# Patient Record
Sex: FEMALE | Race: WHITE
[De-identification: names, ages, dates, MRNs, and addresses within clinical notes are randomized per-mention and may not be internally consistent; named-entity substitution may affect disease eponyms.]

---

## 2018-06-27 ENCOUNTER — HOSPITAL ENCOUNTER (EMERGENCY)
Dept: HOSPITAL 11 - JP.ED | Age: 68
Discharge: HOME | End: 2018-06-27
Payer: MEDICARE

## 2018-06-27 VITALS — DIASTOLIC BLOOD PRESSURE: 76 MMHG | SYSTOLIC BLOOD PRESSURE: 156 MMHG

## 2018-06-27 DIAGNOSIS — I10: ICD-10-CM

## 2018-06-27 DIAGNOSIS — Z79.899: ICD-10-CM

## 2018-06-27 DIAGNOSIS — M70.61: Primary | ICD-10-CM

## 2018-06-27 PROCEDURE — 20610 DRAIN/INJ JOINT/BURSA W/O US: CPT

## 2018-06-27 PROCEDURE — 99283 EMERGENCY DEPT VISIT LOW MDM: CPT

## 2018-06-27 NOTE — EDM.PDOC
ED HPI GENERAL MEDICAL PROBLEM





- General


Chief Complaint: Lower Extremity Injury/Pain


Stated Complaint: RIGHT HIP PAIN


Time Seen by Provider: 06/27/18 15:00


Source of Information: Reports: Patient, Family


History Limitations: Reports: No Limitations





- History of Present Illness


INITIAL COMMENTS - FREE TEXT/NARRATIVE: 





67-year-old female with right hip pain worsening over the past several days now 

radiating into the right posterior buttock and into the right lateral thigh. No 

injury, sudden movements, recent illnesses, or other source of pain. She is 

having difficulty walking, standing, sleeping or even rolling over. She has not 

had this problem in the past.


Onset: Gradual


Location: Reports: Lower Extremity, Right


Severity: Severe


Associated Symptoms: Reports: No Other Symptoms


  ** Right Hip


Pain Score (Numeric/FACES): 10





- Related Data


 Allergies











Allergy/AdvReac Type Severity Reaction Status Date / Time


 


No Known Allergies Allergy   Verified 06/27/18 15:29











Home Meds: 


 Home Meds





Lisinopril/Hydrochlorothiazide [Lisinopril-Hctz 10-12.5 mg Tab] 1 tab PO DAILY 

07/16/16 [History]











Past Medical History


HEENT History: Reports: Impaired Vision


Cardiovascular History: Reports: Hypertension


OB/GYN History: Reports: Pregnancy


Musculoskeletal History: Reports: Arthritis, Fracture


Other Musculoskeletal History: fx ankle fibula





- Infectious Disease History


Infectious Disease History: Reports: Chicken Pox, Measles, Mumps





- Past Surgical History


GI Surgical History: Reports: Cholecystectomy





Social & Family History





- Tobacco Use


Smoking Status *Q: Never Smoker


Second Hand Smoke Exposure: No





- Caffeine Use


Caffeine Use: Reports: Coffee





- Alcohol Use


Days Per Week of Alcohol Use: 0





- Recreational Drug Use


Recreational Drug Use: No





Review of Systems





- Review of Systems


Review Of Systems: See Below


Constitutional: Denies: Fever


Respiratory: Reports: No Symptoms


Cardiovascular: Reports: No Symptoms


GI/Abdominal: Reports: No Symptoms


Skin: Reports: No Symptoms.  Denies: Rash (No rash over the sore area)


Neurological: Denies: Paresthesia


Psychiatric: Reports: No Symptoms





ED EXAM, GENERAL





- Physical Exam


Exam: See Below


Exam Limited By: No Limitations


General Appearance: Alert, No Apparent Distress, Anxious, Other (Patient is 

uncomfortable but not distressed)


Head: Atraumatic


Respiratory/Chest: No Respiratory Distress


Extremities: Other (Buttock and lateral hip were very painful to even passive 

range of motion. On palpation she was exquisitely sore over the greater 

trochanteric area.)


Neurological: Alert, Oriented





Course





- Vital Signs


Last Recorded V/S: 


 Last Vital Signs











Temp  97.9 F   06/27/18 15:29


 


Pulse  86   06/27/18 15:29


 


Resp  16   06/27/18 15:29


 


BP  156/76 H  06/27/18 15:29


 


Pulse Ox  95   06/27/18 15:29














- Orders/Labs/Meds


Meds: 


Medications














Discontinued Medications














Generic Name Dose Route Start Last Admin





  Trade Name Fernando  PRN Reason Stop Dose Admin


 


Bupivacaine HCl  10 ml  06/27/18 16:23  06/27/18 16:47





  Marcaine 0.5%  INJECT  06/27/18 16:24  10 ml





  ONETIME ONE   Administration





     





     





     





     


 


Triamcinolone Acetonide  40 mg  06/27/18 16:18  06/27/18 16:47





  Kenalog-40  INJECT   40 mg





  ASDIRECTED PRN   Administration





  Pain   





     





     





     














- Re-Assessments/Exams


Free Text/Narrative Re-Assessment/Exam: 





06/27/18 17:30


Under sterile conditions, 40 mg of Kenalog along with 10 mL of 0.5% Marcaine 

were infiltrated into the bursa and on a fan distribution around the greater 

trochanter. This was massaged in with a 4 x 4, and pain was markedly improved. 

She was able to stand with minimal discomfort but still had some pain with 

active extension and flexion of the hip. She was also placed on 50 mg of 

prednisone for the next 3-6 days to be taken with her first meal of the day. 

Recheck early next week if not improving satisfactorily.





Departure





- Departure


Time of Disposition: 17:06


Disposition: Home, Self-Care 01


Condition: Good


Clinical Impression: 


 Trochanteric bursitis of right hip








- Discharge Information


Instructions:  Hip Bursitis


Referrals: 


PCP,None [Primary Care Provider] - 


Forms:  ED Department Discharge


Care Plan Goals: 


Take 5 pills of prednisone with your first meal of the day for the next 3-6 

days. Tylenol and ibuprofen will help as well, increase your activity as 

tolerated. Consider rechecking next week if not improving satisfactorily.

## 2018-08-22 ENCOUNTER — HOSPITAL ENCOUNTER (INPATIENT)
Dept: HOSPITAL 11 - JP.ED | Age: 68
LOS: 3 days | Discharge: HOME | DRG: 603 | End: 2018-08-25
Attending: INTERNAL MEDICINE | Admitting: INTERNAL MEDICINE
Payer: MEDICARE

## 2018-08-22 DIAGNOSIS — Z96.641: ICD-10-CM

## 2018-08-22 DIAGNOSIS — Z98.890: ICD-10-CM

## 2018-08-22 DIAGNOSIS — H54.7: ICD-10-CM

## 2018-08-22 DIAGNOSIS — B96.1: ICD-10-CM

## 2018-08-22 DIAGNOSIS — N39.0: ICD-10-CM

## 2018-08-22 DIAGNOSIS — R53.1: ICD-10-CM

## 2018-08-22 DIAGNOSIS — R82.90: ICD-10-CM

## 2018-08-22 DIAGNOSIS — R11.0: ICD-10-CM

## 2018-08-22 DIAGNOSIS — L03.115: Primary | ICD-10-CM

## 2018-08-22 DIAGNOSIS — I10: ICD-10-CM

## 2018-08-22 DIAGNOSIS — M19.90: ICD-10-CM

## 2018-08-22 DIAGNOSIS — R07.9: ICD-10-CM

## 2018-08-22 DIAGNOSIS — Z79.01: ICD-10-CM

## 2018-08-22 PROCEDURE — 85379 FIBRIN DEGRADATION QUANT: CPT

## 2018-08-22 PROCEDURE — 99285 EMERGENCY DEPT VISIT HI MDM: CPT

## 2018-08-22 PROCEDURE — 36415 COLL VENOUS BLD VENIPUNCTURE: CPT

## 2018-08-22 PROCEDURE — 74018 RADEX ABDOMEN 1 VIEW: CPT

## 2018-08-22 PROCEDURE — 81001 URINALYSIS AUTO W/SCOPE: CPT

## 2018-08-22 PROCEDURE — 82553 CREATINE MB FRACTION: CPT

## 2018-08-22 PROCEDURE — 71045 X-RAY EXAM CHEST 1 VIEW: CPT

## 2018-08-22 PROCEDURE — 93005 ELECTROCARDIOGRAM TRACING: CPT

## 2018-08-22 PROCEDURE — 85025 COMPLETE CBC W/AUTO DIFF WBC: CPT

## 2018-08-22 PROCEDURE — 96374 THER/PROPH/DIAG INJ IV PUSH: CPT

## 2018-08-22 PROCEDURE — 84484 ASSAY OF TROPONIN QUANT: CPT

## 2018-08-22 PROCEDURE — 96375 TX/PRO/DX INJ NEW DRUG ADDON: CPT

## 2018-08-22 PROCEDURE — 86140 C-REACTIVE PROTEIN: CPT

## 2018-08-22 PROCEDURE — 80053 COMPREHEN METABOLIC PANEL: CPT

## 2018-08-22 PROCEDURE — 85610 PROTHROMBIN TIME: CPT

## 2018-08-22 RX ADMIN — SODIUM CHLORIDE SCH MLS/HR: 9 INJECTION, SOLUTION INTRAVENOUS at 09:56

## 2018-08-22 RX ADMIN — ONDANSETRON PRN MG: 2 INJECTION, SOLUTION INTRAMUSCULAR; INTRAVENOUS at 19:41

## 2018-08-22 RX ADMIN — ONDANSETRON PRN MG: 2 INJECTION, SOLUTION INTRAMUSCULAR; INTRAVENOUS at 11:14

## 2018-08-22 RX ADMIN — ONDANSETRON PRN MG: 2 INJECTION, SOLUTION INTRAMUSCULAR; INTRAVENOUS at 15:45

## 2018-08-22 RX ADMIN — SODIUM CHLORIDE SCH MLS/HR: 9 INJECTION, SOLUTION INTRAVENOUS at 21:22

## 2018-08-22 NOTE — CR
CHEST: Portable

 

CLINICAL HISTORY:Abdominal pain

 

COMPARISON:None

 

FINDINGS:  Right size and pulmonary vascularity are normal. Lung fields are clear. No infiltrate effu
richard or pneumothorax seen. There is some scarring in the left upper lobe

 

 

IMPRESSION:  No acute cardiopulmonary process

## 2018-08-22 NOTE — PCM.HP
H&P History of Present Illness





- General


Date of Service: 08/22/18


Admit Problem/Dx: 


 Admission Diagnosis/Problem





Admission Diagnosis/Problem      Cellulitis








Source of Information: Patient, Family, Provider, RN Notes Reviewed


History Limitations: Reports: No Limitations





- History of Present Illness


Initial Comments - Free Text/Narative: 





Ms. Fisher is a 67-year-old woman who is admitted through the emergency 

department with new onset of weakness and nausea, secondary to cellulitis of 

the right hip and thigh. She is status post right total hip arthroplasty done 

on Friday, August 17. She was discharged home the following day and did well 

until early this morning, when she noted development of erythema surrounding 

the surgical wound associated with significant weakness and nausea. She came 

into the emergency department for further evaluation, white blood cell count is 

within normal range, CRP is elevated, and there is appearance of significant 

cellulitis involving the right thigh and lateral hip. She was seen and 

evaluated in the emergency department by Dr. Srikanth Stein for orthopedic 

consult, he did not feel that the wound was infected or that there was evidence 

of a deeper seeded infection. He is recommended hospitalization for IV 

antibiotic therapy. To this point there is no evidence of associated sepsis.


  ** Right Hip


Pain Score (Numeric/FACES): 8





  ** Abdominal


Pain Score (Numeric/FACES): 6





  ** Chest


Pain Score (Numeric/FACES): 4





- Related Data


Allergies/Adverse Reactions: 


 Allergies











Allergy/AdvReac Type Severity Reaction Status Date / Time


 


No Known Allergies Allergy   Verified 08/22/18 06:37











Home Medications: 


 Home Meds





Lisinopril/Hydrochlorothiazide [Lisinopril-Hctz 10-12.5 mg Tab] 1 tab PO DAILY 

07/16/16 [History]


Acetaminophen [Tylenol Extra Strength] 500 mg PO Q4HR PRN 08/22/18 [History]


Apixaban [Eliquis] 2.5 mg PO BID 08/22/18 [History]


traMADol HCl [Tramadol HCl] 50 mg PO Q6H PRN 08/22/18 [History]











Past Medical History


HEENT History: Reports: Impaired Vision


Cardiovascular History: Reports: Hypertension


OB/GYN History: Reports: Pregnancy


Musculoskeletal History: Reports: Arthritis, Fracture


Other Musculoskeletal History: fx ankle fibula


Hematologic History: Reports: Anticoagulation Therapy





- Infectious Disease History


Infectious Disease History: Reports: Chicken Pox, Measles, Mumps





- Past Surgical History


GI Surgical History: Reports: Cholecystectomy


Female  Surgical History: Reports: Tubal Ligation


Musculoskeletal Surgical History: Reports: Hip Replacement





Social & Family History





- Tobacco Use


Smoking Status *Q: Never Smoker





- Caffeine Use


Caffeine Use: Reports: Coffee





- Recreational Drug Use


Recreational Drug Use: No





H&P Review of Systems





- Review of Systems:


Review Of Systems: See Below


General: Reports: Chills, Weakness, Diaphoresis, Decreased Appetite


HEENT: Reports: No Symptoms


Pulmonary: Reports: No Symptoms


Cardiovascular: Reports: No Symptoms


Gastrointestinal: Reports: Anorexia, Nausea.  Denies: Abdominal Pain, Black 

Stool, Bloody Stool, Diarrhea, Difficulty Swallowing, Distension, Vomiting


Genitourinary: Reports: No Symptoms


Musculoskeletal: Reports: No Symptoms


Skin: Reports: Erythema (With tenderness right lateral hip and thigh)


Psychiatric: Reports: No Symptoms


Neurological: Reports: No Symptoms


Hematologic/Lymphatic: Reports: No Symptoms


Immunologic: Reports: No Symptoms





Exam





- Exam


Exam: See Below





- Vital Signs


Vital Signs: 


 Last Vital Signs











Temp  97.6 F   08/22/18 06:40


 


Pulse  63   08/22/18 06:40


 


Resp  18   08/22/18 06:40


 


BP  156/57 H  08/22/18 06:40


 


Pulse Ox  99   08/22/18 06:40











Weight: 190 lb





- Exam


Quality Assessment: DVT Prophylaxis


General: Alert, Oriented, Cooperative, Moderate Distress


HEENT: Conjunctiva Clear, Hearing Intact, Mucosa Moist & Pink, Normal Nasal 

Septum, Posterior Pharynx Clear, Pupils Equal


Neck: Supple, Trachea Midline, +2 Carotid Pulse wo Bruit


Lungs: Clear to Auscultation, Normal Respiratory Effort


Cardiovascular: Regular Rate, Regular Rhythm, Normal S1, Normal S2.  No: 

Systolic Murmur, Diastolic Murmur


GI/Abdominal Exam: Soft, Non-Tender, No Organomegaly, No Distention


Back Exam: Normal Inspection, Full Range of Motion


Extremities: Non-Tender, No Pedal Edema


Skin: Other (Erythema with tenderness to palpation right lateral hip and thigh, 

surgical wound is intact with no evidence of drainage)


Neurological: Cranial Nerves Intact, Strength Equal Bilateral, Normal Speech, 

Normal Tone, Sensation Intact.  No: Focal Deficit


Neuro Extensive - Mental Status: Alert, Oriented x3, Normal Mood/Affect, Normal 

Cognition, Memory Intact





- Patient Data


Lab Results Last 24 hrs: 


 Laboratory Results - last 24 hr











  08/22/18 08/22/18 08/22/18 Range/Units





  07:07 07:07 07:07 


 


WBC  6.6    (4.5-11.0)  K/uL


 


RBC  3.63    (3.30-5.50)  M/uL


 


Hgb  11.9 L    (12.0-15.0)  g/dL


 


Hct  34.2 L    (36.0-48.0)  %


 


MCV  94    (80-98)  fL


 


MCH  33 H    (27-31)  pg


 


MCHC  35    (32-36)  %


 


Plt Count  272    (150-400)  K/uL


 


Neut % (Auto)  76 H    (36-66)  %


 


Lymph % (Auto)  14 L    (24-44)  %


 


Mono % (Auto)  10 H    (2-6)  %


 


Eos % (Auto)  1 L    (2-4)  %


 


Baso % (Auto)  1    (0-1)  %


 


PT   10.4   (9.5-12.0)  sec


 


INR   0.94   (0.80-1.20)  


 


D-Dimer, Quantitative     (0.0-400.0)  ng/mL


 


Sodium    133 L  (140-148)  mmol/L


 


Potassium    3.2 L  (3.6-5.2)  mmol/L


 


Chloride    95 L  (100-108)  mmol/L


 


Carbon Dioxide    30  (21-32)  mmol/L


 


Anion Gap    11.2  (5.0-14.0)  mmol/L


 


BUN    8  (7-18)  mg/dL


 


Creatinine    0.8  (0.6-1.0)  mg/dL


 


Est Cr Clr Drug Dosing    67.27  mL/min


 


Estimated GFR (MDRD)    > 60  (>60)  


 


Glucose    126 H  ()  mg/dL


 


Lactic Acid     (0.4-2.0)  mmol/L


 


Calcium    8.8  (8.5-10.1)  mg/dL


 


Total Bilirubin    0.8  (0.2-1.0)  mg/dL


 


AST    38 H  (15-37)  U/L


 


ALT    29  (12-78)  U/L


 


Alkaline Phosphatase    50  ()  U/L


 


CK-MB (CK-2)    2.1  (0-3.6)  mg/mL


 


Troponin I    < 0.017  (0.000-0.056)  ng/mL


 


C-Reactive Protein     (0.0-0.3)  mg/dL


 


Total Protein    6.2 L  (6.4-8.2)  g/dL


 


Albumin    2.9 L  (3.4-5.0)  g/dL


 


Globulin    3.3  (2.3-3.5)  g/dL


 


Albumin/Globulin Ratio    0.9 L  (1.2-2.2)  














  08/22/18 08/22/18 08/22/18 Range/Units





  07:12 07:18 09:38 


 


WBC     (4.5-11.0)  K/uL


 


RBC     (3.30-5.50)  M/uL


 


Hgb     (12.0-15.0)  g/dL


 


Hct     (36.0-48.0)  %


 


MCV     (80-98)  fL


 


MCH     (27-31)  pg


 


MCHC     (32-36)  %


 


Plt Count     (150-400)  K/uL


 


Neut % (Auto)     (36-66)  %


 


Lymph % (Auto)     (24-44)  %


 


Mono % (Auto)     (2-6)  %


 


Eos % (Auto)     (2-4)  %


 


Baso % (Auto)     (0-1)  %


 


PT     (9.5-12.0)  sec


 


INR     (0.80-1.20)  


 


D-Dimer, Quantitative  1820 H    (0.0-400.0)  ng/mL


 


Sodium     (140-148)  mmol/L


 


Potassium     (3.6-5.2)  mmol/L


 


Chloride     (100-108)  mmol/L


 


Carbon Dioxide     (21-32)  mmol/L


 


Anion Gap     (5.0-14.0)  mmol/L


 


BUN     (7-18)  mg/dL


 


Creatinine     (0.6-1.0)  mg/dL


 


Est Cr Clr Drug Dosing     mL/min


 


Estimated GFR (MDRD)     (>60)  


 


Glucose     ()  mg/dL


 


Lactic Acid    1.9  (0.4-2.0)  mmol/L


 


Calcium     (8.5-10.1)  mg/dL


 


Total Bilirubin     (0.2-1.0)  mg/dL


 


AST     (15-37)  U/L


 


ALT     (12-78)  U/L


 


Alkaline Phosphatase     ()  U/L


 


CK-MB (CK-2)     (0-3.6)  mg/mL


 


Troponin I     (0.000-0.056)  ng/mL


 


C-Reactive Protein   7.85 H   (0.0-0.3)  mg/dL


 


Total Protein     (6.4-8.2)  g/dL


 


Albumin     (3.4-5.0)  g/dL


 


Globulin     (2.3-3.5)  g/dL


 


Albumin/Globulin Ratio     (1.2-2.2)  











Result Diagrams: 


 08/22/18 07:07





 08/22/18 07:07





*Q Meaningful Use (ADM)





- VTE Risk Assess *Q


Each Risk Factor Represents 1 Point: Obesity ( BMI > 25 kg/m2)


Total Score 1 Point Risk Factors: 1


Each Risk Factor Represents 2 Points: Age 60 - 74 Years


Total Score 2 Point Risk Factors: 2


Each Risk Factor Represents 3 Points: None


Total Score 3 Point Risk Factors: 0


Each Risk Factor Represents 5 Points: Hip, Pelvis or Leg Fracture, Less than 1 

month


Total Score 5 Point Risk Factors: 5


Venous Thromboembolism Risk Factor Score *Q: 8


Problem List Initiated/Reviewed/Updated: Yes


Orders Last 24hrs: 


 Active Orders 24 hr











 Category Date Time Status


 


 Patient Status Manage Transfer [TRANSFER] Routine ADT  08/22/18 09:38 Active


 


 Cardiac Monitoring [RC] .As Directed Care  08/22/18 06:54 Active


 


 EKG Documentation Completion [RC] ASDIRECTED Care  08/22/18 06:55 Active


 


 Peripheral IV Care [RC] .AS DIRECTED Care  08/22/18 06:55 Active


 


 CULTURE BLOOD [BC] Stat Lab  08/22/18 09:48 Received


 


 CULTURE BLOOD [BC] Stat Lab  08/22/18 09:55 Received


 


 UA W/MICROSCOPIC [URIN] Stat Lab  08/22/18 09:08 Ordered


 


 Lactated Ringers [Ringers, Lactated] 1,000 ml Med  08/22/18 09:45 Active





 IV ASDIRECTED   


 


 Meropenem [Merrem] 1 gm Med  08/22/18 09:45 Ordered





 Sodium Chloride 0.9% [Normal Saline] 100 ml   





 IV Q8H   


 


 Morphine Med  08/22/18 06:54 Active





 4 mg IVPUSH Q10M PRN   


 


 Nitroglycerin [Nitrostat] Med  08/22/18 06:54 Active





 0.4 mg SL Q5M PRN   


 


 Sodium Chloride 0.9% [Saline Flush] Med  08/22/18 06:54 Active





 10 ml FLUSH ASDIRECTED PRN   


 


 Vancomycin 1.3 gm Med  08/22/18 10:00 Active





 Sodium Chloride 0.9% [Normal Saline] 250 ml   





 IV Q12H   


 


 Blood Culture x2 Reflex Set [OM.PC] Urgent Oth  08/22/18 09:37 Ordered


 


 Peripheral IV Insertion Adult [OM.PC] Stat Oth  08/22/18 06:54 Ordered


 


 Saline Lock Insert [OM.PC] Stat Oth  08/22/18 06:54 Ordered


 


 Resuscitation Status Routine Resus Stat  08/22/18 09:40 Ordered


 


 EKG 12 Lead [EK] Stat Ther  08/22/18 06:55 Ordered








 Medication Orders





Lactated Ringer's (Ringers, Lactated)  1,000 mls @ 500 mls/hr IV ASDIRECTED DIANA


   Stop: 08/22/18 13:46


   Last Admin: 08/22/18 09:52  Dose: 500 mls/hr


Meropenem 1 gm/ Sodium (Chloride)  100 mls @ 200 mls/hr IV Q8H DIANA


Vancomycin HCl 1.3 gm/ Sodium (Chloride)  250 mls @ 250 mls/hr IV Q12H DIANA


   Last Admin: 08/22/18 09:56  Dose: 250 mls/hr


Morphine Sulfate (Morphine)  4 mg IVPUSH Q10M PRN


   PRN Reason: Chest Pain


   Stop: 08/23/18 06:55


   Last Admin: 08/22/18 07:02  Dose: 4 mg


Nitroglycerin (Nitrostat)  0.4 mg SL Q5M PRN


   PRN Reason: Chest Pain


   Stop: 08/23/18 06:55


Sodium Chloride (Saline Flush)  10 ml FLUSH ASDIRECTED PRN


   PRN Reason: Keep Vein Open


   Last Admin: 08/22/18 07:13  Dose: 10 ml








Assessment/Plan Comment:: 





ASSESSMENT AND PLAN





CELLULITIS RIGHT LATERAL HIP AND THIGH-status post right total hip arthroplasty 

done 5 days ago. Wound is intact with no evidence of significant drainage and 

no current evidence of sepsis.


-Blood cultures pending


-IV fluids


-Soft tissue ultrasound look for any evidence of abscess


-IV vancomycin and meropenem pending culture results





STATUS POST RIGHT TOTAL HIP ARTHROPLASTY


-Physical therapy consult





HYPERTENSION


-Continue outpatient medications





MAINTENANCE ISSUES


-DVT prophylaxis; current therapy with Eliquis should provide adequate DVT 

prophylaxis


-GI prophylaxis; not indicated


-Rowe catheter; not indicated


-Nutrition; regular diet


-Nicotine dependence; not required





CODE STATUS-FULL CODE





ADMISSION STATUS-patient will be admitted to inpatient status, expect at least 

a 2 night hospital stay for evaluation and management of problems as outlined 

above.  At the time of this admission I do not reasonably expected evaluation 

and management of this problem will require more than a 96 hour hospital stay.





DISPOSITION-anticipate discharge to home after the hospital stay.





PRIMARY CARE PROVIDER-Perri Castillo

## 2018-08-22 NOTE — CR
Abdomen 1V Flat

 

CLINICAL HISTORY: Abdominal pain

 

FINDINGS: The bowel gas pattern is nonobstructive. No abnormal masses are noted. There are surgical c
lips in the right upper quadrant.  

 

IMPRESSION: Nonacute intestinal gas pattern

## 2018-08-22 NOTE — US
Extremity Non Vascular Rt

 

CLINICAL HISTORY: Cellulitis right lateral hip and thigh, recent hip surgery

 

FINDINGS: There is some soft tissue edema around the incision site. No underlying fluid collections a
re identified

 

IMPRESSION: Redness and swelling near the incision site may represent cellulitis

 

No evidence of abscess

## 2018-08-22 NOTE — EDM.PDOC
<OfficerThomas - Last Filed: 08/22/18 06:56>





ED HPI GENERAL MEDICAL PROBLEM





- General


Chief Complaint: General


Stated Complaint: [place] HAD HIP REPLACEMENT LAST WEEK


Time Seen by Provider: 08/22/18 06:49


Source of Information: Reports: Patient, Family, RN Notes Reviewed


History Limitations: Reports: No Limitations





- History of Present Illness


INITIAL COMMENTS - FREE TEXT/NARRATIVE: 





67-year-old female presents to the emergency department today complaint of 

chest pain she recently underwent right hip replacement at [facility] was 

discharged last week has been doing well only taking tramadol and Tylenol for 

pain control. Awoke this morning with nausea and hip pain was diaphoretic did 

feel short of breath EMS services were called was given Zofran IM in route upon 

arrival at the emergency department developed chest pain with numbness and 

tingling down the left arm was given 4 aspirin on the ramp. At this time she 

continues to complain of chest pain, shortness of breath, nausea


  ** Right Hip


Pain Score (Numeric/FACES): 8





  ** Abdominal


Pain Score (Numeric/FACES): 6





  ** Chest


Pain Score (Numeric/FACES): 4





- Related Data


 Allergies











Allergy/AdvReac Type Severity Reaction Status Date / Time


 


No Known Allergies Allergy   Verified 08/22/18 06:37











Home Meds: 


 Home Meds





Lisinopril/Hydrochlorothiazide [Lisinopril-Hctz 10-12.5 mg Tab] 1 tab PO DAILY 

07/16/16 [History]


Acetaminophen [Tylenol Extra Strength] 500 mg PO Q4HR PRN 08/22/18 [History]


Apixaban [Eliquis] 2.5 mg PO BID 08/22/18 [History]


traMADol HCl [Tramadol HCl] 50 mg PO Q6H PRN 08/22/18 [History]











Past Medical History


HEENT History: Reports: Impaired Vision


Cardiovascular History: Reports: Hypertension


OB/GYN History: Reports: Pregnancy


Musculoskeletal History: Reports: Arthritis, Fracture


Other Musculoskeletal History: fx ankle fibula


Hematologic History: Reports: Anticoagulation Therapy





- Infectious Disease History


Infectious Disease History: Reports: Chicken Pox, Measles, Mumps





- Past Surgical History


GI Surgical History: Reports: Cholecystectomy


Female  Surgical History: Reports: Tubal Ligation


Musculoskeletal Surgical History: Reports: Hip Replacement





Social & Family History





- Tobacco Use


Smoking Status *Q: Never Smoker





- Caffeine Use


Caffeine Use: Reports: Coffee





- Recreational Drug Use


Recreational Drug Use: No





ED ROS GENERAL





- Review of Systems


Review Of Systems: See Below


Constitutional: Reports: No Symptoms


HEENT: Reports: No Symptoms


Respiratory: Reports: Shortness of Breath


Cardiovascular: Reports: Chest Pain


GI/Abdominal: Reports: Nausea.  Denies: Vomiting


: Reports: No Symptoms


Musculoskeletal: Reports: Arm Pain (Left arm)


Skin: Reports: No Symptoms


Neurological: Reports: Numbness (Left arm)





ED EXAM, GENERAL





- Physical Exam


Exam: See Below


Exam Limited By: No Limitations


General Appearance: Alert, Mild Distress


Head: Atraumatic, Normocephalic


Neck: Normal Inspection, Supple, Non-Tender, Full Range of Motion


Respiratory/Chest: No Respiratory Distress, Lungs Clear, Normal Breath Sounds, 

No Accessory Muscle Use, Chest Non-Tender


Cardiovascular: Regular Rate, Rhythm, No Murmur


GI/Abdominal: Soft, Non-Tender


Extremities: Non-Tender, No Pedal Edema


Neurological: Alert, Oriented





Course





- Vital Signs


Last Recorded V/S: 


 Last Vital Signs











Temp  36.4 C   08/22/18 06:40


 


Pulse  63   08/22/18 06:40


 


Resp  18   08/22/18 06:40


 


BP  156/57 H  08/22/18 06:40


 


Pulse Ox  99   08/22/18 06:40














- Orders/Labs/Meds


Orders: 


 Active Orders 24 hr











 Category Date Time Status


 


 Cardiac Monitoring [RC] .As Directed Care  08/22/18 06:54 Active


 


 EKG Documentation Completion [RC] ASDIRECTED Care  08/22/18 06:55 Active


 


 Peripheral IV Care [RC] .AS DIRECTED Care  08/22/18 06:55 Active


 


 Morphine Med  08/22/18 06:54 Active





 4 mg IVPUSH Q10M PRN   


 


 Nitroglycerin [Nitrostat] Med  08/22/18 06:54 Active





 0.4 mg SL Q5M PRN   


 


 Sodium Chloride 0.9% [Saline Flush] Med  08/22/18 06:54 Active





 10 ml FLUSH ASDIRECTED PRN   


 


 Peripheral IV Insertion Adult [OM.PC] Stat Oth  08/22/18 06:54 Ordered


 


 Saline Lock Insert [OM.PC] Stat Oth  08/22/18 06:54 Ordered


 


 EKG 12 Lead [EK] Stat Ther  08/22/18 06:55 Ordered








 Medication Orders





Morphine Sulfate (Morphine)  4 mg IVPUSH Q10M PRN


   PRN Reason: Chest Pain


   Stop: 08/23/18 06:55


   Last Admin: 08/22/18 07:02  Dose: 4 mg


Nitroglycerin (Nitrostat)  0.4 mg SL Q5M PRN


   PRN Reason: Chest Pain


   Stop: 08/23/18 06:55


Sodium Chloride (Saline Flush)  10 ml FLUSH ASDIRECTED PRN


   PRN Reason: Keep Vein Open


   Last Admin: 08/22/18 07:13  Dose: 10 ml








Labs: 


 Laboratory Tests











  08/22/18 08/22/18 08/22/18 Range/Units





  07:07 07:07 07:07 


 


WBC  6.6    (4.5-11.0)  K/uL


 


RBC  3.63    (3.30-5.50)  M/uL


 


Hgb  11.9 L    (12.0-15.0)  g/dL


 


Hct  34.2 L    (36.0-48.0)  %


 


MCV  94    (80-98)  fL


 


MCH  33 H    (27-31)  pg


 


MCHC  35    (32-36)  %


 


Plt Count  272    (150-400)  K/uL


 


Neut % (Auto)  76 H    (36-66)  %


 


Lymph % (Auto)  14 L    (24-44)  %


 


Mono % (Auto)  10 H    (2-6)  %


 


Eos % (Auto)  1 L    (2-4)  %


 


Baso % (Auto)  1    (0-1)  %


 


PT   10.4   (9.5-12.0)  sec


 


INR   0.94   (0.80-1.20)  


 


D-Dimer, Quantitative     (0.0-400.0)  ng/mL


 


Sodium    133 L  (140-148)  mmol/L


 


Potassium    3.2 L  (3.6-5.2)  mmol/L


 


Chloride    95 L  (100-108)  mmol/L


 


Carbon Dioxide    30  (21-32)  mmol/L


 


Anion Gap    11.2  (5.0-14.0)  mmol/L


 


BUN    8  (7-18)  mg/dL


 


Creatinine    0.8  (0.6-1.0)  mg/dL


 


Est Cr Clr Drug Dosing    67.27  mL/min


 


Estimated GFR (MDRD)    > 60  (>60)  


 


Glucose    126 H  ()  mg/dL


 


Calcium    8.8  (8.5-10.1)  mg/dL


 


Total Bilirubin    0.8  (0.2-1.0)  mg/dL


 


AST    38 H  (15-37)  U/L


 


ALT    29  (12-78)  U/L


 


Alkaline Phosphatase    50  ()  U/L


 


CK-MB (CK-2)    2.1  (0-3.6)  mg/mL


 


Troponin I    < 0.017  (0.000-0.056)  ng/mL


 


C-Reactive Protein     (0.0-0.3)  mg/dL


 


Total Protein    6.2 L  (6.4-8.2)  g/dL


 


Albumin    2.9 L  (3.4-5.0)  g/dL


 


Globulin    3.3  (2.3-3.5)  g/dL


 


Albumin/Globulin Ratio    0.9 L  (1.2-2.2)  














  08/22/18 08/22/18 Range/Units





  07:12 07:18 


 


WBC    (4.5-11.0)  K/uL


 


RBC    (3.30-5.50)  M/uL


 


Hgb    (12.0-15.0)  g/dL


 


Hct    (36.0-48.0)  %


 


MCV    (80-98)  fL


 


MCH    (27-31)  pg


 


MCHC    (32-36)  %


 


Plt Count    (150-400)  K/uL


 


Neut % (Auto)    (36-66)  %


 


Lymph % (Auto)    (24-44)  %


 


Mono % (Auto)    (2-6)  %


 


Eos % (Auto)    (2-4)  %


 


Baso % (Auto)    (0-1)  %


 


PT    (9.5-12.0)  sec


 


INR    (0.80-1.20)  


 


D-Dimer, Quantitative  1820 H   (0.0-400.0)  ng/mL


 


Sodium    (140-148)  mmol/L


 


Potassium    (3.6-5.2)  mmol/L


 


Chloride    (100-108)  mmol/L


 


Carbon Dioxide    (21-32)  mmol/L


 


Anion Gap    (5.0-14.0)  mmol/L


 


BUN    (7-18)  mg/dL


 


Creatinine    (0.6-1.0)  mg/dL


 


Est Cr Clr Drug Dosing    mL/min


 


Estimated GFR (MDRD)    (>60)  


 


Glucose    ()  mg/dL


 


Calcium    (8.5-10.1)  mg/dL


 


Total Bilirubin    (0.2-1.0)  mg/dL


 


AST    (15-37)  U/L


 


ALT    (12-78)  U/L


 


Alkaline Phosphatase    ()  U/L


 


CK-MB (CK-2)    (0-3.6)  mg/mL


 


Troponin I    (0.000-0.056)  ng/mL


 


C-Reactive Protein   7.85 H  (0.0-0.3)  mg/dL


 


Total Protein    (6.4-8.2)  g/dL


 


Albumin    (3.4-5.0)  g/dL


 


Globulin    (2.3-3.5)  g/dL


 


Albumin/Globulin Ratio    (1.2-2.2)  











Meds: 


Medications











Generic Name Dose Route Start Last Admin





  Trade Name Freq  PRN Reason Stop Dose Admin


 


Morphine Sulfate  4 mg  08/22/18 06:54  08/22/18 07:02





  Morphine  IVPUSH  08/23/18 06:55  4 mg





  Q10M PRN   Administration





  Chest Pain   





     





     





     


 


Nitroglycerin  0.4 mg  08/22/18 06:54  





  Nitrostat  SL  08/23/18 06:55  





  Q5M PRN   





  Chest Pain   





     





     





     


 


Sodium Chloride  10 ml  08/22/18 06:54  08/22/18 07:13





  Saline Flush  FLUSH   10 ml





  ASDIRECTED PRN   Administration





  Keep Vein Open   





     





     





     














Discontinued Medications














Generic Name Dose Route Start Last Admin





  Trade Name Freq  PRN Reason Stop Dose Admin


 


Ondansetron HCl  4 mg  08/22/18 07:11  08/22/18 07:12





  Zofran  IVPUSH  08/22/18 07:12  4 mg





  ONETIME ONE   Administration





     





     





     





     


 


Ondansetron HCl  Confirm  08/22/18 07:11  08/22/18 07:19





  Zofran  Administered  08/22/18 07:12  Not Given





  Dose   





  4 mg   





  .ROUTE   





  .STK-MED ONE   





     





     





     





     


 


Potassium Chloride  40 meq  08/22/18 07:58  08/22/18 08:14





  Potassium Chloride  PO  08/22/18 07:59  40 meq





  ONETIME ONE   Administration





     





     





     





     














Departure





- Departure


Disposition: Admitted As Inpatient 67


Clinical Impression: 


 Cellulitis of hip, right, Hypokalemia








- Discharge Information


Referrals: 


Georgina Castillo PA-C [Primary Care Provider] - 


Forms:  ED Department Discharge





<Rm Perez - Last Filed: 08/22/18 08:52>





ED HPI GENERAL MEDICAL PROBLEM





- History of Present Illness


INITIAL COMMENTS - FREE TEXT/NARRATIVE: 





Is on Eliquis since her surgery. Missed last night's dose.


Onset: Today


Onset Date: 08/22/18


Onset Time: 06:00


Duration: Minutes:


Location: Reports: Chest, Lower Extremity, Right


Quality: Reports: Pressure


Severity: Moderate


Improves with: Reports: None


Worsens with: Reports: Other (unknown)


Context: Reports: Other (Recent hip replacement)


Associated Symptoms: Reports: Chest Pain (L sided), Nausea/Vomiting (no vomiting

), Shortness of Breath.  Denies: Fever/Chills


Treatments PTA: Reports: Other (see below) (EMS aspirin, Zofran IM)





ED ROS GENERAL





- Review of Systems


Psychiatric: Reports: Anxiety (today)





ED EXAM, GENERAL





- Physical Exam


Eye Exam: Bilateral Eye: Normal Inspection


Ears: Normal External Exam, Normal Canal, Hearing Grossly Normal


Ear Exam: Bilateral Ear: Auricle Normal, Canal Normal


Nose: Normal Inspection, Normal Mucosa, No Blood


Throat/Mouth: Normal Inspection, Normal Lips, Normal Voice, No Airway Compromise


Back Exam: Normal Inspection.  No: CVA Tenderness (R), CVA Tenderness (L)


Extremities: Increased Warmth, Redness (R hip area, surgical wound present from 

recent hip replacement.)


Neurological: CN II-XII Intact, Normal Cognition, No Motor/Sensory Deficits


Psychiatric: Normal Affect, Normal Mood


Skin Exam: Warm, Dry, Intact, No Rash, Erythema (R hip area with increased 

warmth.), Increased Warmth (R hip area), Wound/Incision (surgical wound)


Lymphatic: No Adenopathy





EKG INTERPRETATION


EKG Date: 08/22/18


Time: 06:25


Rhythm: NSR


Rate (Beats/Min): 62


Axis: Normal


P-Wave: Present


QRS: Normal


ST-T: Normal


QT: Normal


Comparison: NA - No Prior EKG





Course





- Vital Signs


Text/Narrative:: 





Dr. Gold notified @ [facility]





- Orders/Labs/Meds


Labs: 


 Laboratory Tests











  08/22/18 08/22/18 08/22/18 Range/Units





  07:07 07:07 07:07 


 


WBC  6.6    (4.5-11.0)  K/uL


 


RBC  3.63    (3.30-5.50)  M/uL


 


Hgb  11.9 L    (12.0-15.0)  g/dL


 


Hct  34.2 L    (36.0-48.0)  %


 


MCV  94    (80-98)  fL


 


MCH  33 H    (27-31)  pg


 


MCHC  35    (32-36)  %


 


Plt Count  272    (150-400)  K/uL


 


Neut % (Auto)  76 H    (36-66)  %


 


Lymph % (Auto)  14 L    (24-44)  %


 


Mono % (Auto)  10 H    (2-6)  %


 


Eos % (Auto)  1 L    (2-4)  %


 


Baso % (Auto)  1    (0-1)  %


 


PT   10.4   (9.5-12.0)  sec


 


INR   0.94   (0.80-1.20)  


 


D-Dimer, Quantitative     (0.0-400.0)  ng/mL


 


Sodium    133 L  (140-148)  mmol/L


 


Potassium    3.2 L  (3.6-5.2)  mmol/L


 


Chloride    95 L  (100-108)  mmol/L


 


Carbon Dioxide    30  (21-32)  mmol/L


 


Anion Gap    11.2  (5.0-14.0)  mmol/L


 


BUN    8  (7-18)  mg/dL


 


Creatinine    0.8  (0.6-1.0)  mg/dL


 


Est Cr Clr Drug Dosing    67.27  mL/min


 


Estimated GFR (MDRD)    > 60  (>60)  


 


Glucose    126 H  ()  mg/dL


 


Calcium    8.8  (8.5-10.1)  mg/dL


 


Total Bilirubin    0.8  (0.2-1.0)  mg/dL


 


AST    38 H  (15-37)  U/L


 


ALT    29  (12-78)  U/L


 


Alkaline Phosphatase    50  ()  U/L


 


CK-MB (CK-2)    2.1  (0-3.6)  mg/mL


 


Troponin I    < 0.017  (0.000-0.056)  ng/mL


 


C-Reactive Protein     (0.0-0.3)  mg/dL


 


Total Protein    6.2 L  (6.4-8.2)  g/dL


 


Albumin    2.9 L  (3.4-5.0)  g/dL


 


Globulin    3.3  (2.3-3.5)  g/dL


 


Albumin/Globulin Ratio    0.9 L  (1.2-2.2)  














  08/22/18 08/22/18 Range/Units





  07:12 07:18 


 


WBC    (4.5-11.0)  K/uL


 


RBC    (3.30-5.50)  M/uL


 


Hgb    (12.0-15.0)  g/dL


 


Hct    (36.0-48.0)  %


 


MCV    (80-98)  fL


 


MCH    (27-31)  pg


 


MCHC    (32-36)  %


 


Plt Count    (150-400)  K/uL


 


Neut % (Auto)    (36-66)  %


 


Lymph % (Auto)    (24-44)  %


 


Mono % (Auto)    (2-6)  %


 


Eos % (Auto)    (2-4)  %


 


Baso % (Auto)    (0-1)  %


 


PT    (9.5-12.0)  sec


 


INR    (0.80-1.20)  


 


D-Dimer, Quantitative  1820 H   (0.0-400.0)  ng/mL


 


Sodium    (140-148)  mmol/L


 


Potassium    (3.6-5.2)  mmol/L


 


Chloride    (100-108)  mmol/L


 


Carbon Dioxide    (21-32)  mmol/L


 


Anion Gap    (5.0-14.0)  mmol/L


 


BUN    (7-18)  mg/dL


 


Creatinine    (0.6-1.0)  mg/dL


 


Est Cr Clr Drug Dosing    mL/min


 


Estimated GFR (MDRD)    (>60)  


 


Glucose    ()  mg/dL


 


Calcium    (8.5-10.1)  mg/dL


 


Total Bilirubin    (0.2-1.0)  mg/dL


 


AST    (15-37)  U/L


 


ALT    (12-78)  U/L


 


Alkaline Phosphatase    ()  U/L


 


CK-MB (CK-2)    (0-3.6)  mg/mL


 


Troponin I    (0.000-0.056)  ng/mL


 


C-Reactive Protein   7.85 H  (0.0-0.3)  mg/dL


 


Total Protein    (6.4-8.2)  g/dL


 


Albumin    (3.4-5.0)  g/dL


 


Globulin    (2.3-3.5)  g/dL


 


Albumin/Globulin Ratio    (1.2-2.2)  














- Radiology Interpretation


Free Text/Narrative:: 





CXR-negative





Abd flat-stool ascending colon, small stool descending colon.





Departure





- Departure


Time of Disposition: 09:00


Condition: Fair





- Discharge Information


*PRESCRIPTION DRUG MONITORING PROGRAM REVIEWED*: Not Applicable


*COPY OF PRESCRIPTION DRUG MONITORING REPORT IN PATIENT [facility]: Not Applicable

## 2018-08-23 RX ADMIN — ONDANSETRON PRN MG: 2 INJECTION, SOLUTION INTRAMUSCULAR; INTRAVENOUS at 03:37

## 2018-08-23 RX ADMIN — SODIUM CHLORIDE SCH MLS/HR: 9 INJECTION, SOLUTION INTRAVENOUS at 13:33

## 2018-08-23 RX ADMIN — SODIUM CHLORIDE SCH MLS/HR: 9 INJECTION, SOLUTION INTRAVENOUS at 21:45

## 2018-08-23 RX ADMIN — ONDANSETRON PRN MG: 2 INJECTION, SOLUTION INTRAMUSCULAR; INTRAVENOUS at 09:26

## 2018-08-23 NOTE — PCM.PN
- General Info


Date of Service: 08/23/18


Subjective Update: 





Ms. Fisher has been stable since admission yesterday with no significant 

temperature elevations and stable vital signs. Energy level and appetite have 

improved significantly and she is no longer experiencing significant nausea. 

Area of cellulitis has improved significantly from admission.





- Review of Systems


General: Denies: Fever, Weakness, Chills


Pulmonary: Reports: No Symptoms


Cardiovascular: Reports: No Symptoms


Gastrointestinal: Reports: No Symptoms


Skin: Reports: Other (Cellulitis significantly improved)





- Patient Data


Vitals - Most Recent: 


 Last Vital Signs











Temp  98.6 F   08/23/18 12:39


 


Pulse  65   08/23/18 12:39


 


Resp  16   08/23/18 12:39


 


BP  129/59 L  08/23/18 12:39


 


Pulse Ox  97   08/23/18 12:39











Weight - Most Recent: 196 lb 3.382 oz


I&O - Last 24 Hours: 


 Intake & Output











 08/22/18 08/23/18 08/23/18





 22:59 06:59 14:59


 


Intake Total 840 2996 1250


 


Output Total 700 700 325


 


Balance 140 2296 925











Lab Results Last 24 Hours: 


 Laboratory Results - last 24 hr











  08/23/18 08/23/18 Range/Units





  05:00 05:00 


 


WBC  5.2   (4.5-11.0)  K/uL


 


RBC  3.44   (3.30-5.50)  M/uL


 


Hgb  11.4 L   (12.0-15.0)  g/dL


 


Hct  32.9 L   (36.0-48.0)  %


 


MCV  96   (80-98)  fL


 


MCH  33 H   (27-31)  pg


 


MCHC  35   (32-36)  %


 


Plt Count  267   (150-400)  K/uL


 


Add Manual Diff  Yes   


 


Neutrophils % (Manual)  62   (36-66)  %


 


Band Neutrophils %  1 L   (5-11)  %


 


Lymphocytes % (Manual)  20 L   (24-44)  %


 


Monocytes % (Manual)  11 H   (2-6)  %


 


Eosinophils % (Manual)  3   (2-4)  %


 


Metamyelocytes %  3   %


 


Sodium   139 L  (140-148)  mmol/L


 


Potassium   3.8  (3.6-5.2)  mmol/L


 


Chloride   104  (100-108)  mmol/L


 


Carbon Dioxide   27  (21-32)  mmol/L


 


Anion Gap   11.8  (5.0-14.0)  mmol/L


 


BUN   4 L  (7-18)  mg/dL


 


Creatinine   0.8  (0.6-1.0)  mg/dL


 


Est Cr Clr Drug Dosing   67.27  mL/min


 


Estimated GFR (MDRD)   > 60  (>60)  


 


Glucose   100  ()  mg/dL


 


Calcium   8.6  (8.5-10.1)  mg/dL


 


Magnesium   1.6 L  (1.8-2.4)  mg/dL











Burt Results Last 24 Hours: 


 Microbiology











 08/22/18 09:55 Aerobic Blood Culture - Preliminary





 Blood - Venous - Lab Draw    NO GROWTH AFTER 1 DAY





 Anaerobic Blood Culture - Preliminary





    NO GROWTH AFTER 1 DAY


 


 08/22/18 09:48 Aerobic Blood Culture - Preliminary





 Blood - Arm, Left    NO GROWTH AFTER 1 DAY





 Anaerobic Blood Culture - Preliminary





    NO GROWTH AFTER 1 DAY


 


 08/22/18 15:27 Urine Culture - Preliminary





 Urine, Clean Catch 











Med Orders - Current: 


 Current Medications





Acetaminophen (Tylenol)  650 mg PO Q4H PRN


   PRN Reason: Pain (Mild 1-3)/fever


   Last Admin: 08/23/18 08:01 Dose:  325 mg


Albuterol (Proventil Neb Soln)  2.5 mg NEB Q4H PRN


   PRN Reason: Shortness Of Breath/wheezing


Apixaban (Eliquis)  2.5 mg PO BID Critical [facility] hospital


   Last Admin: 08/23/18 09:17 Dose:  2.5 mg


Hydrochlorothiazide (Hydrochlorothiazide)  12.5 mg PO DAILY [facility] hospital


   Last Admin: 08/23/18 09:18 Dose:  12.5 mg


Meropenem 1 gm/ Sodium (Chloride)  100 mls @ 200 mls/hr IV Q8H [facility]


   Last Admin: 08/23/18 12:37 Dose:  200 mls/hr


Vancomycin HCl 1.3 gm/ Sodium (Chloride)  250 mls @ 250 mls/hr IV Q12H [facility] hospital


   Last Admin: 08/23/18 13:33 Dose:  250 mls/hr


Lisinopril (Prinivil)  10 mg PO DAILY [facility] hospital


   Last Admin: 08/23/18 09:18 Dose:  10 mg


Magnesium Hydroxide (Milk Of Magnesia)  30 ml PO Q12H PRN


   PRN Reason: Constipation


Magnesium Oxide (Magnesium Oxide)  400 mg PO BID Critical [facility] hospital


   Last Admin: 08/23/18 09:18 Dose:  400 mg


Morphine Sulfate (Morphine)  2 mg IVPUSH Q2H PRN


   PRN Reason: Pain (severe 7-10)


Ondansetron HCl (Zofran)  4 mg IV Q4H PRN


   PRN Reason: Nausea/Vomiting


   Last Admin: 08/23/18 09:26 Dose:  4 mg


Polyethylene Glycol (Miralax)  17 gm PO DAILY PRN


   PRN Reason: Constipation


Senna/Docusate Sodium (Senna Plus)  1 tab PO BID PRN


   PRN Reason: Constipation


Sodium Chloride (Saline Flush)  10 ml FLUSH ASDIRECTED PRN


   PRN Reason: Keep Vein Open


Tramadol HCl (Ultram)  50 mg PO Q6H PRN


   PRN Reason: Pain





Discontinued Medications





Lactated Ringer's (Ringers, Lactated)  1,000 mls @ 500 mls/hr IV ASDIRECTED Critical access hospital


   Stop: 08/22/18 13:46


   Last Admin: 08/22/18 09:52 Dose:  500 mls/hr


Lactated Ringer's (Ringers, Lactated)  1,000 mls @ 125 mls/hr IV ASDIRECTED Critical access hospital


   Last Admin: 08/23/18 06:18 Dose:  125 mls/hr


Magnesium Sulfate 2 gm/ Premix  50 mls @ 25 mls/hr IV ONETIME ONE


   Stop: 08/23/18 10:59


   Last Admin: 08/23/18 09:21 Dose:  25 mls/hr


Metoclopramide HCl (Reglan)  5 mg IVPUSH ONETIME ONE


   Stop: 08/22/18 09:08


   Last Admin: 08/22/18 09:12 Dose:  5 mg


Morphine Sulfate (Morphine)  4 mg IVPUSH Q10M PRN


   PRN Reason: Chest Pain


   Stop: 08/23/18 06:55


   Last Admin: 08/22/18 07:02 Dose:  4 mg


Nitroglycerin (Nitrostat)  0.4 mg SL Q5M PRN


   PRN Reason: Chest Pain


   Stop: 08/23/18 06:55


Ondansetron HCl (Zofran)  4 mg IVPUSH ONETIME ONE


   Stop: 08/22/18 07:12


   Last Admin: 08/22/18 07:12 Dose:  4 mg


Ondansetron HCl (Zofran) Confirm Administered Dose 4 mg .ROUTE .STK-MED ONE


   Stop: 08/22/18 07:12


   Last Admin: 08/22/18 07:19 Dose:  Not Given


Potassium Chloride (Potassium Chloride)  40 meq PO ONETIME ONE


   Stop: 08/22/18 07:59


   Last Admin: 08/22/18 08:14 Dose:  40 meq


Sodium Chloride (Saline Flush)  10 ml FLUSH ASDIRECTED PRN


   PRN Reason: Keep Vein Open


   Last Admin: 08/22/18 07:13 Dose:  10 ml











- Exam


Quality Assessment: DVT Prophylaxis


General: Alert, Oriented, Cooperative, No Acute Distress


Lungs: Clear to Auscultation, Normal Respiratory Effort


Cardiovascular: Regular Rate, Regular Rhythm, No Murmurs


GI/Abdominal Exam: Soft, Non-Tender, No Organomegaly, No Distention


Extremities: Non-Tender, No Pedal Edema


Skin: Other (Almost total resolution of cellulitis right lateral thigh and hip)





- Problem List Review


Problem List Initiated/Reviewed/Updated: Yes





- My Orders


Last 24 Hours: 


My Active Orders





08/22/18 15:27


CULTURE URINE [RM] Routine 





08/22/18 21:00


Apixaban [Eliquis]   2.5 mg PO BID 





08/23/18 09:00


Lisinopril [Prinivil]   10 mg PO DAILY 


Magnesium Oxide   400 mg PO BID 


hydroCHLOROthiazide   12.5 mg PO DAILY 





08/23/18 13:53


Convert IV to Saline Lock [OM.PC] Routine 





08/24/18 05:00


BASIC METABOLIC PANEL,BMP [CHEM] Timed 


MAGNESIUM [CHEM] Timed 





08/24/18 09:30


VANCOMYCIN TROUGH [CHEM] Routine 














- Plan


Plan:: 





ASSESSMENT AND PLAN





CELLULITIS RIGHT LATERAL HIP AND THIGH-status post right total hip arthroplasty 

done 6 days ago. Wound is intact with no evidence of significant drainage and 

no current evidence of sepsis. Marked improvement of cellulitis over the past 

24 hours with current management. Ultrasound showed no evidence of abscess or 

deeper tissue infection


-Blood cultures pending


-Saline lock IV


-IV vancomycin and meropenem pending culture results





STATUS POST RIGHT TOTAL HIP ARTHROPLASTY


-Physical therapy consult





HYPERTENSION


-Continue outpatient medications





MAINTENANCE ISSUES


-DVT prophylaxis; current therapy with Eliquis should provide adequate DVT 

prophylaxis


-GI prophylaxis; not indicated


-Rowe catheter; not indicated


-Nutrition; regular diet


-Nicotine dependence; not required





CODE STATUS-FULL CODE





ADMISSION STATUS-patient will be admitted to inpatient status, expect at least 

a 2 night hospital stay for evaluation and management of problems as outlined 

above.  At the time of this admission I do not reasonably expected evaluation 

and management of this problem will require more than a 96 hour hospital stay.





DISPOSITION-anticipate discharge to home after the hospital stay.





PRIMARY CARE PROVIDER-Perri Castillo

## 2018-08-23 NOTE — PCM.SN
- Free Text/Narrative


Note: 





time 22:50pm. Call from [facility] nursing





S: Concerns of feeling itchy, restless, she is on her second dose of vancomycin

, which is nearly completed





o; nursing staff reports no redness, rash, shortness of breath, or any 

respiratory involvement. Vital signs this evening temperature 36.7 pulse 85 

respirations 18 blood pressure 149/54,  O2 sat 98% on room air.





a; concerns for medication adverse reaction





p; will give Benadryl 25 mg IV then reassess.

## 2018-08-24 RX ADMIN — SODIUM CHLORIDE SCH MLS/HR: 9 INJECTION, SOLUTION INTRAVENOUS at 10:56

## 2018-08-24 RX ADMIN — SODIUM CHLORIDE SCH MLS/HR: 9 INJECTION, SOLUTION INTRAVENOUS at 21:41

## 2018-08-24 NOTE — PCM.PN
- General Info


Date of Service: 08/24/18


Subjective Update: 





This patient has done well since yesterday with no significant temperature 

elevation, vital signs have been stable. She did have possible panic attack 

yesterday evening versus drug reaction from the IV magnesium. Symptoms have 

resolved and not recurred since then.


Functional Status: Reports: Pain Controlled, Tolerating Diet, Ambulating, 

Urinating





- Review of Systems


General: Denies: Fever, Weakness, Chills


Pulmonary: Reports: No Symptoms


Cardiovascular: Reports: No Symptoms


Gastrointestinal: Reports: No Symptoms





- Patient Data


Vitals - Most Recent: 


 Last Vital Signs











Temp  97.7 F   08/24/18 07:29


 


Pulse  73   08/24/18 07:29


 


Resp  16   08/24/18 07:29


 


BP  146/64 H  08/24/18 09:08


 


Pulse Ox  98   08/24/18 07:29











Weight - Most Recent: 196 lb 3.382 oz


I&O - Last 24 Hours: 


 Intake & Output











 08/23/18 08/24/18 08/24/18





 22:59 06:59 14:59


 


Intake Total 1846 1118 1000


 


Output Total  700 


 


Balance 9647 899 2343











Lab Results Last 24 Hours: 


 Laboratory Results - last 24 hr











  08/24/18 Range/Units





  05:45 


 


Sodium  139 L  (140-148)  mmol/L


 


Potassium  3.5 L  (3.6-5.2)  mmol/L


 


Chloride  105  (100-108)  mmol/L


 


Carbon Dioxide  27  (21-32)  mmol/L


 


Anion Gap  10.5  (5.0-14.0)  mmol/L


 


BUN  5 L  (7-18)  mg/dL


 


Creatinine  0.8  (0.6-1.0)  mg/dL


 


Est Cr Clr Drug Dosing  67.27  mL/min


 


Estimated GFR (MDRD)  > 60  (>60)  


 


Glucose  91  ()  mg/dL


 


Calcium  8.3 L  (8.5-10.1)  mg/dL


 


Magnesium  1.8  (1.8-2.4)  mg/dL











Burt Results Last 24 Hours: 


 Microbiology











 08/22/18 09:55 Aerobic Blood Culture - Preliminary





 Blood - Venous - Lab Draw    NO GROWTH AFTER 2 DAYS





 Anaerobic Blood Culture - Preliminary





    NO GROWTH AFTER 2 DAYS


 


 08/22/18 09:48 Aerobic Blood Culture - Preliminary





 Blood - Arm, Left    NO GROWTH AFTER 2 DAYS





 Anaerobic Blood Culture - Preliminary





    NO GROWTH AFTER 2 DAYS


 


 08/22/18 15:27 Urine Culture - Final





 Urine, Clean Catch    Klebsiella Pneumonia Ss Pneumo











Med Orders - Current: 


 Current Medications





Acetaminophen (Tylenol)  650 mg PO Q4H PRN


   PRN Reason: Pain (Mild 1-3)/fever


   Last Admin: 08/23/18 19:04 Dose:  325 mg


Albuterol (Proventil Neb Soln)  2.5 mg NEB Q4H PRN


   PRN Reason: Shortness Of Breath/wheezing


Apixaban (Eliquis)  2.5 mg PO BID [facility]


   Last Admin: 08/24/18 09:08 Dose:  2.5 mg


Hydrochlorothiazide (Hydrochlorothiazide)  12.5 mg PO DAILY [facility]


   Last Admin: 08/24/18 09:08 Dose:  12.5 mg


Meropenem 1 gm/ Sodium (Chloride)  100 mls @ 200 mls/hr IV Q8H [facility]


   Last Admin: 08/24/18 03:29 Dose:  200 mls/hr


Vancomycin HCl 1.3 gm/ Sodium (Chloride)  250 mls @ 250 mls/hr IV Q12H [facility]


   Last Admin: 08/23/18 21:45 Dose:  250 mls/hr


Lisinopril (Prinivil)  10 mg PO DAILY [facility]


   Last Admin: 08/24/18 09:08 Dose:  10 mg


Magnesium Hydroxide (Milk Of Magnesia)  30 ml PO Q12H PRN


   PRN Reason: Constipation


Magnesium Oxide (Magnesium Oxide)  400 mg PO BID [facility]


   Last Admin: 08/24/18 09:08 Dose:  Not Given


Morphine Sulfate (Morphine)  2 mg IVPUSH Q2H PRN


   PRN Reason: Pain (severe 7-10)


Ondansetron HCl (Zofran)  4 mg IV Q4H PRN


   PRN Reason: Nausea/Vomiting


   Last Admin: 08/23/18 09:26 Dose:  4 mg


Polyethylene Glycol (Miralax)  17 gm PO DAILY PRN


   PRN Reason: Constipation


Potassium Chloride (Klor-Con M20)  40 meq PO ONETIME ONE


   Stop: 08/24/18 17:01


Senna/Docusate Sodium (Senna Plus)  1 tab PO BID PRN


   PRN Reason: Constipation


Sodium Chloride (Saline Flush)  10 ml FLUSH ASDIRECTED PRN


   PRN Reason: Keep Vein Open


Tramadol HCl (Ultram)  50 mg PO Q6H PRN


   PRN Reason: Pain





Discontinued Medications





Diphenhydramine HCl (Benadryl)  25 mg IVPUSH ONETIME ONE


   Stop: 08/23/18 22:59


   Last Admin: 08/23/18 23:09 Dose:  25 mg


Diphenhydramine HCl (Benadryl) Confirm Administered Dose 50 mg .ROUTE .STK-MED 

ONE


   Stop: 08/23/18 23:06


   Last Admin: 08/24/18 02:51 Dose:  Not Given


Lactated Ringer's (Ringers, Lactated)  1,000 mls @ 500 mls/hr IV ASDIRECTED [facility]


   Stop: 08/22/18 13:46


   Last Admin: 08/22/18 09:52 Dose:  500 mls/hr


Lactated Ringer's (Ringers, Lactated)  1,000 mls @ 125 mls/hr IV ASDIRECTED [facility]


   Last Admin: 08/23/18 06:18 Dose:  125 mls/hr


Magnesium Sulfate 2 gm/ Premix  50 mls @ 25 mls/hr IV ONETIME ONE


   Stop: 08/23/18 10:59


   Last Admin: 08/23/18 09:21 Dose:  25 mls/hr


Metoclopramide HCl (Reglan)  5 mg IVPUSH ONETIME ONE


   Stop: 08/22/18 09:08


   Last Admin: 08/22/18 09:12 Dose:  5 mg


Morphine Sulfate (Morphine)  4 mg IVPUSH Q10M PRN


   PRN Reason: Chest Pain


   Stop: 08/23/18 06:55


   Last Admin: 08/22/18 07:02 Dose:  4 mg


Nitroglycerin (Nitrostat)  0.4 mg SL Q5M PRN


   PRN Reason: Chest Pain


   Stop: 08/23/18 06:55


Ondansetron HCl (Zofran)  4 mg IVPUSH ONETIME ONE


   Stop: 08/22/18 07:12


   Last Admin: 08/22/18 07:12 Dose:  4 mg


Ondansetron HCl (Zofran) Confirm Administered Dose 4 mg .ROUTE .STK-MED ONE


   Stop: 08/22/18 07:12


   Last Admin: 08/22/18 07:19 Dose:  Not Given


Potassium Chloride (Potassium Chloride)  40 meq PO ONETIME ONE


   Stop: 08/22/18 07:59


   Last Admin: 08/22/18 08:14 Dose:  40 meq


Potassium Chloride (Klor-Con M20)  40 meq PO ONETIME ONE


   Stop: 08/24/18 09:01


   Last Admin: 08/24/18 09:08 Dose:  40 meq


Sodium Chloride (Saline Flush)  10 ml FLUSH ASDIRECTED PRN


   PRN Reason: Keep Vein Open


   Last Admin: 08/22/18 07:13 Dose:  10 ml











- Exam


Quality Assessment: DVT Prophylaxis


General: Alert, Oriented, Cooperative, No Acute Distress


Lungs: Clear to Auscultation, Normal Respiratory Effort


Cardiovascular: Regular Rate, Regular Rhythm, No Murmurs


GI/Abdominal Exam: Soft, Non-Tender, No Organomegaly, No Distention


Skin: Other (Marked improvement and resolution of cellulitis left lateral thigh 

and hip)





- Problem List Review


Problem List Initiated/Reviewed/Updated: Yes





- My Orders


Last 24 Hours: 


My Active Orders





08/23/18 13:53


Convert IV to Saline Lock [OM.PC] Routine 





08/24/18 09:39


VANCOMYCIN TROUGH [CHEM] Routine 





08/24/18 10:03


LORazepam [Ativan]   0.5 mg IVPUSH Q4H PRN 





08/24/18 17:00


Potassium Chloride [Klor-Con M20]   40 meq PO ONETIME ONE 














- Plan


Plan:: 





ASSESSMENT AND PLAN





CELLULITIS RIGHT LATERAL HIP AND THIGH-status post right total hip arthroplasty 

done 7 days ago. Wound is intact with no evidence of significant drainage and 

no current evidence of sepsis. Most total resolution of cellulitis since 

admission, blood cultures remain negative


-Plan for transition to oral antibiotic therapy tomorrow with discharge to home


-Saline lock IV


-IV vancomycin and meropenem pending culture results





STATUS POST RIGHT TOTAL HIP ARTHROPLASTY


-Physical therapy consult





HYPERTENSION


-Continue outpatient medications





MAINTENANCE ISSUES


-DVT prophylaxis; current therapy with Eliquis should provide adequate DVT 

prophylaxis


-GI prophylaxis; not indicated


-Rowe catheter; not indicated


-Nutrition; regular diet


-Nicotine dependence; not required





CODE STATUS-FULL CODE





ADMISSION STATUS-patient will be admitted to inpatient status, expect at least 

a 2 night hospital stay for evaluation and management of problems as outlined 

above.  At the time of this admission I do not reasonably expected evaluation 

and management of this problem will require more than a 96 hour hospital stay.





DISPOSITION-anticipate discharge to home after the hospital stay.





PRIMARY CARE PROVIDER-Perri Castillo

## 2018-08-25 VITALS — DIASTOLIC BLOOD PRESSURE: 72 MMHG | SYSTOLIC BLOOD PRESSURE: 157 MMHG

## 2018-08-25 NOTE — PCM.DCSUM1
**Discharge Summary





- Hospital Course


Brief History: Ms. Fisher is a 67-year-old woman who was admitted through the 

emergency department with fever and weakness secondary to cellulitis of her 

right lateral hip and thigh.





- Discharge Data


Discharge Date: 08/25/18


Discharge Disposition: Home, Self-Care 01


Condition: Fair





- Discharge Diagnosis/Problem(s)


(1) UTI due to Klebsiella species


SNOMED Code(s): 737616741572576


   ICD Code: N39.0 - URINARY TRACT INFECTION, SITE NOT SPECIFIED; B96.1 - 

KLEBSIELLA PNEUMONIAE AS THE CAUSE OF DISEASES CLASSD [id number]   Status: Acute   

Current Visit: Yes   





(2) Cellulitis of hip, right


SNOMED Code(s): 4017381


   ICD Code: L03.115 - CELLULITIS OF RIGHT LOWER LIMB   Status: Acute   Current 

Visit: Yes   





- Patient Summary/Data


Consults: 


 Consultations





08/22/18 10:11


PT Evaluation and Treatment [CONS] Routine 


   Please Evaluate and Treat.


   PT Reason for Consult: Status post right total hip arthroplasty


   This query below is only for informational purposes and is not editable.











Hospital Course: 





Ms. Fisher is a 67-year-old woman who was admitted through the emergency 

department with new onset of weakness and nausea, secondary to cellulitis of 

the right hip and thigh. She is status post right total hip arthroplasty done 

on Friday, August 17. She was discharged home the following day and did well 

until early on the morning of admission, when she noted development of erythema 

surrounding the surgical wound associated with significant weakness and nausea. 

She came into the emergency department for further evaluation, white blood cell 

count is within normal range, CRP is elevated, and there is appearance of 

significant cellulitis involving the right thigh and lateral hip. She was seen 

and evaluated in the emergency department by Dr. Srikanth Stein for orthopedic 

consult, he did not feel that the wound was infected or that there was evidence 

of a deeper seeded infection. He recommended hospitalization for IV antibiotic 

therapy. To this point there is no evidence of associated sepsis.





Blood cultures were obtained prior to admission and she was started on broad-

spectrum IV antibiotic therapy with vancomycin and meropenem because of recent 

hospitalization and surgery. She was given IV fluids for hydration and pain 

medication as needed. Ultrasound of the right hip and thigh was obtained which 

showed no evidence of abscess or deeper infection. She improved significantly 

over the next few days of hospitalization and by the time of discharge been 

total resolution of the cellulitis for a period of 24 hours. She was seen daily 

by physical therapy during hospitalization to work on mobility because of her 

recent right hip arthroplasty. Blood cultures returned as negative showing no 

growth. Urinalysis at the time of admission did show evidence of urinary tract 

infection, urine culture was obtained and did grow out Klebsiella. She will be 

discharged home on cephalexin 500 mg every 8 hours for an additional 5 days. 

Activity will be as tolerated and she will resume her usual diet. She already 

has follow-up appointment for orthopedics in 6 days and will be scheduled for 

follow-up appointment with primary care within 1 week. Also has been scheduled 

to begin outpatient physical therapy.





- Patient Instructions


Diet: Usual Diet as Tolerated


Activity: As Tolerated


Other/Special Instructions: Outpatient physical therapy as scheduled. Please 

schedule follow-up appointment with primary care provider within one week. 

Return immediately to the emergency room for any recurrent fever, skin redness, 

or increase in pain.





- Discharge Plan


*PRESCRIPTION DRUG MONITORING PROGRAM REVIEWED*: Not Applicable


*COPY OF PRESCRIPTION DRUG MONITORING REPORT IN PATIENT [facility]: Not Applicable


Prescriptions/Med Rec: 


Cephalexin [Keflex] 500 mg PO Q8H #15 capsule


Home Medications: 


 Home Meds





Lisinopril/Hydrochlorothiazide [Lisinopril-Hctz 10-12.5 mg Tab] 1 tab PO DAILY 

07/16/16 [History]


Acetaminophen [Tylenol Extra Strength] 500 mg PO Q4HR PRN 08/22/18 [History]


Apixaban [Eliquis] 2.5 mg PO BID 08/22/18 [History]


traMADol HCl [Tramadol HCl] 50 mg PO Q6H PRN 08/22/18 [History]


Loratadine 10 mg PO DAILY 08/23/18 [History]


Cephalexin [Keflex] 500 mg PO Q8H #15 capsule 08/25/18 [Rx]








Referrals: 


Yuly Robert PT [Physical Therapist] - 08/28/18 8:30 am


Georgina Castillo PA-C [Primary Care Provider] - 





- Discharge Summary/Plan Comment


DC Time >30 min.: No





- Patient Data


Vitals - Most Recent: 


 Last Vital Signs











Temp  97.6 F   08/25/18 04:00


 


Pulse  75   08/25/18 04:00


 


Resp  18   08/25/18 04:00


 


BP  157/72 H  08/25/18 09:16


 


Pulse Ox  98   08/25/18 04:00











Weight - Most Recent: 196 lb 3.382 oz


I&O - Last 24 hours: 


 Intake & Output











 08/24/18 08/25/18 08/25/18





 22:59 06:59 14:59


 


Intake Total 1590 600 


 


Output Total  600 


 


Balance 1590 0 











Lab Results - Last 24 hrs: 


 Laboratory Results - last 24 hr











  08/24/18 Range/Units





  09:39 


 


Vancomycin Trough  14.5  (10.0-20.0)  ug/mL











MICHAEL Results - Last 24 hrs: 


 Microbiology











 08/22/18 09:55 Aerobic Blood Culture - Preliminary





 Blood - Venous - Lab Draw    NO GROWTH AFTER 2 DAYS





 Anaerobic Blood Culture - Preliminary





    NO GROWTH AFTER 2 DAYS


 


 08/22/18 09:48 Aerobic Blood Culture - Preliminary





 Blood - Arm, Left    NO GROWTH AFTER 2 DAYS





 Anaerobic Blood Culture - Preliminary





    NO GROWTH AFTER 2 DAYS


 


 08/22/18 15:27 Urine Culture - Final





 Urine, Clean Catch    Klebsiella Pneumonia Ss Pneumo











Med Orders - Current: 


 Current Medications





Acetaminophen (Tylenol)  650 mg PO Q4H PRN


   PRN Reason: Pain (Mild 1-3)/fever


   Last Admin: 08/24/18 21:06 Dose:  325 mg


Albuterol (Proventil Neb Soln)  2.5 mg NEB Q4H PRN


   PRN Reason: Shortness Of Breath/wheezing


Apixaban (Eliquis)  2.5 mg PO BID [facility]


   Last Admin: 08/25/18 09:15 Dose:  2.5 mg


Hydrochlorothiazide (Hydrochlorothiazide)  12.5 mg PO DAILY [facility]


   Last Admin: 08/25/18 09:15 Dose:  12.5 mg


Meropenem 1 gm/ Sodium (Chloride)  100 mls @ 200 mls/hr IV Q8H [facility]


   Last Admin: 08/25/18 04:29 Dose:  200 mls/hr


Vancomycin HCl 1.3 gm/ Sodium (Chloride)  250 mls @ 250 mls/hr IV Q12H [facility]


   Last Admin: 08/24/18 21:41 Dose:  250 mls/hr


Lisinopril (Prinivil)  10 mg PO DAILY [facility]


   Last Admin: 08/25/18 09:16 Dose:  10 mg


Lorazepam (Ativan)  0.5 mg IVPUSH Q4H PRN


   PRN Reason: Anxiety


Magnesium Hydroxide (Milk Of Magnesia)  30 ml PO Q12H PRN


   PRN Reason: Constipation


Magnesium Oxide (Magnesium Oxide)  400 mg PO BID [facility]


   Last Admin: 08/25/18 09:15 Dose:  Not Given


Morphine Sulfate (Morphine)  2 mg IVPUSH Q2H PRN


   PRN Reason: Pain (severe 7-10)


Ondansetron HCl (Zofran)  4 mg IV Q4H PRN


   PRN Reason: Nausea/Vomiting


   Last Admin: 08/23/18 09:26 Dose:  4 mg


Polyethylene Glycol (Miralax)  17 gm PO DAILY PRN


   PRN Reason: Constipation


Senna/Docusate Sodium (Senna Plus)  1 tab PO BID PRN


   PRN Reason: Constipation


Sodium Chloride (Saline Flush)  10 ml FLUSH ASDIRECTED PRN


   PRN Reason: Keep Vein Open


Tramadol HCl (Ultram)  50 mg PO Q6H PRN


   PRN Reason: Pain





Discontinued Medications





Diphenhydramine HCl (Benadryl)  25 mg IVPUSH ONETIME ONE


   Stop: 08/23/18 22:59


   Last Admin: 08/23/18 23:09 Dose:  25 mg


Diphenhydramine HCl (Benadryl) Confirm Administered Dose 50 mg .ROUTE .STK-MED 

ONE


   Stop: 08/23/18 23:06


   Last Admin: 08/24/18 02:51 Dose:  Not Given


Lactated Ringer's (Ringers, Lactated)  1,000 mls @ 500 mls/hr IV ASDIRECTED [facility]


   Stop: 08/22/18 13:46


   Last Admin: 08/22/18 09:52 Dose:  500 mls/hr


Lactated Ringer's (Ringers, Lactated)  1,000 mls @ 125 mls/hr IV ASDIRECTED [facility]


   Last Admin: 08/23/18 06:18 Dose:  125 mls/hr


Magnesium Sulfate 2 gm/ Premix  50 mls @ 25 mls/hr IV ONETIME ONE


   Stop: 08/23/18 10:59


   Last Admin: 08/23/18 09:21 Dose:  25 mls/hr


Metoclopramide HCl (Reglan)  5 mg IVPUSH ONETIME ONE


   Stop: 08/22/18 09:08


   Last Admin: 08/22/18 09:12 Dose:  5 mg


Morphine Sulfate (Morphine)  4 mg IVPUSH Q10M PRN


   PRN Reason: Chest Pain


   Stop: 08/23/18 06:55


   Last Admin: 08/22/18 07:02 Dose:  4 mg


Nitroglycerin (Nitrostat)  0.4 mg SL Q5M PRN


   PRN Reason: Chest Pain


   Stop: 08/23/18 06:55


Ondansetron HCl (Zofran)  4 mg IVPUSH ONETIME ONE


   Stop: 08/22/18 07:12


   Last Admin: 08/22/18 07:12 Dose:  4 mg


Ondansetron HCl (Zofran) Confirm Administered Dose 4 mg .ROUTE .STK-MED ONE


   Stop: 08/22/18 07:12


   Last Admin: 08/22/18 07:19 Dose:  Not Given


Potassium Chloride (Potassium Chloride)  40 meq PO ONETIME ONE


   Stop: 08/22/18 07:59


   Last Admin: 08/22/18 08:14 Dose:  40 meq


Potassium Chloride (Klor-Con M20)  40 meq PO ONETIME ONE


   Stop: 08/24/18 09:01


   Last Admin: 08/24/18 09:08 Dose:  40 meq


Potassium Chloride (Klor-Con M20)  40 meq PO ONETIME ONE


   Stop: 08/24/18 17:01


   Last Admin: 08/24/18 16:55 Dose:  40 meq


Sodium Chloride (Saline Flush)  10 ml FLUSH ASDIRECTED PRN


   PRN Reason: Keep Vein Open


   Last Admin: 08/22/18 07:13 Dose:  10 ml











- Exam


General: Reports: Alert, Oriented, Cooperative, No Acute Distress


Lungs: Reports: Clear to Auscultation, Normal Respiratory Effort


Cardiovascular: Reports: Regular Rate, Regular Rhythm, No Murmurs


GI/Abdominal Exam: Soft, Non-Tender, No Organomegaly, No Distention


Skin: Reports: Other (Cellulitis of the right lateral hip and thigh has resolved

)





*Q Meaningful Use (DIS)





- VTE *Q


VTE Pharmacological Contraindications *Q: High INR Value

## 2021-02-10 ENCOUNTER — HOSPITAL ENCOUNTER (EMERGENCY)
Dept: HOSPITAL 11 - JP.ED | Age: 71
Discharge: HOME | End: 2021-02-10
Payer: MEDICARE

## 2021-02-10 VITALS — HEART RATE: 54 BPM | SYSTOLIC BLOOD PRESSURE: 162 MMHG | DIASTOLIC BLOOD PRESSURE: 60 MMHG

## 2021-02-10 DIAGNOSIS — Z87.440: ICD-10-CM

## 2021-02-10 DIAGNOSIS — H83.2X9: Primary | ICD-10-CM

## 2021-02-10 DIAGNOSIS — I10: ICD-10-CM

## 2021-02-10 DIAGNOSIS — Z79.01: ICD-10-CM

## 2021-02-10 DIAGNOSIS — K21.9: ICD-10-CM

## 2021-02-10 DIAGNOSIS — Z79.899: ICD-10-CM

## 2021-02-10 PROCEDURE — 84484 ASSAY OF TROPONIN QUANT: CPT

## 2021-02-10 PROCEDURE — 82150 ASSAY OF AMYLASE: CPT

## 2021-02-10 PROCEDURE — 99284 EMERGENCY DEPT VISIT MOD MDM: CPT

## 2021-02-10 PROCEDURE — 83690 ASSAY OF LIPASE: CPT

## 2021-02-10 PROCEDURE — 76705 ECHO EXAM OF ABDOMEN: CPT

## 2021-02-10 PROCEDURE — 81001 URINALYSIS AUTO W/SCOPE: CPT

## 2021-02-10 PROCEDURE — 86140 C-REACTIVE PROTEIN: CPT

## 2021-02-10 PROCEDURE — 85025 COMPLETE CBC W/AUTO DIFF WBC: CPT

## 2021-02-10 PROCEDURE — 80053 COMPREHEN METABOLIC PANEL: CPT

## 2021-02-10 PROCEDURE — 36415 COLL VENOUS BLD VENIPUNCTURE: CPT

## 2021-02-10 PROCEDURE — 96375 TX/PRO/DX INJ NEW DRUG ADDON: CPT

## 2021-02-10 PROCEDURE — 93005 ELECTROCARDIOGRAM TRACING: CPT

## 2021-02-10 PROCEDURE — C9113 INJ PANTOPRAZOLE SODIUM, VIA: HCPCS

## 2021-02-10 PROCEDURE — 96374 THER/PROPH/DIAG INJ IV PUSH: CPT

## 2021-02-10 NOTE — CRLUS
INDICATION: Upper abdominal pain, belching



TECHNIQUE: Ultrasound abdomen limited. Sonographic images of the right 

upper quadrant were obtained using gray-scale and color Doppler images.



COMPARISON: None



FINDINGS: Moderate degradation of image quality noted due to body habitus. 



Liver: The liver parenchyma is normal in echotexture. 



Gallbladder: Previous cholecystectomy noted without significant intra- or 

extrahepatic biliary ductal dilatation seen. No sonographic Antlers sign 

is present. 



Common bile duct: 4 mm. 



Pancreas: The visualized portions of the pancreatic head and body are 

normal in appearance. 



Right Kidney: 10.5 cm. There is an anechoic structure within the right 

renal sinus which may represent mild-to-moderate renal pelviectasis. 



Vascular: The visualized abdominal aorta and IVC are unremarkable. The 

visualized portal vein is patent with normal anterograde flow. 



IMPRESSION: 



1. There is an anechoic structure within the right renal sinus which may 

represent mild-to-moderate renal pelviectasis.



Dictated by Darren Campbell MD @ 2/10/2021 5:12:24 PM



Dictated by: Darren Campbell MD @ 02/10/2021 17:12:27



(Electronically Signed)

## 2021-02-10 NOTE — EDM.PDOC
ED HPI GENERAL MEDICAL PROBLEM





- General


Chief Complaint: Gastrointestinal Problem


Stated Complaint: DIZZY,STOMACH PAIN


Time Seen by Provider: 02/10/21 15:35


Source of Information: Reports: Patient


History Limitations: Reports: No Limitations





- History of Present Illness


INITIAL COMMENTS - FREE TEXT/NARRATIVE: 





pt arrived feeling very dizzy--like the room was spinning. She was wretching and

vomiting and was very belchy. She did have her covid shot 1 week ago. She had a 

brief episode similar to  this after she had her covid shot.  She has not had a 

inner ear problem in the past. 


Onset: Today, Sudden


Duration: Hour(s):


Location: Reports: Head, Abdomen, Generalized


Associated Symptoms: Reports: Nausea/Vomiting


  ** Abdominal


Pain Score (Numeric/FACES): 7





  ** Headache


Pain Score (Numeric/FACES): 3





- Related Data


                                    Allergies











Allergy/AdvReac Type Severity Reaction Status Date / Time


 


No Known Allergies Allergy   Verified 02/10/21 15:35











Home Meds: 


                                    Home Meds





Lisinopril/Hydrochlorothiazide [Lisinopril-Hctz 10-12.5 mg Tab] 1 tab PO DAILY 

07/16/16 [History]


Acetaminophen [Tylenol Extra Strength] 500 mg PO Q4HR PRN 08/22/18 [History]











Past Medical History


HEENT History: Reports: Impaired Vision


Cardiovascular History: Reports: Hypertension


OB/GYN History: Reports: Pregnancy


Musculoskeletal History: Reports: Arthritis, Fracture


Other Musculoskeletal History: fx ankle fibula


Hematologic History: Reports: Anticoagulation Therapy





- Infectious Disease History


Infectious Disease History: Reports: Chicken Pox, Measles, Mumps





- Past Surgical History


GI Surgical History: Reports: Cholecystectomy


Female  Surgical History: Reports: Tubal Ligation


Musculoskeletal Surgical History: Reports: Hip Replacement





Social & Family History





- Caffeine Use


Caffeine Use: Reports: Coffee, Soda





ED ROS GENERAL





- Review of Systems


Review Of Systems: See Below


Constitutional: Reports: No Symptoms


HEENT: Reports: Vertigo, Other (pt suddenly became dizzy this late morning. She 

then began to vomit and she had reflux and belching. )


Respiratory: Reports: Shortness of Breath


Cardiovascular: Reports: No Symptoms


Endocrine: Reports: No Symptoms


GI/Abdominal: Reports: Abdominal Pain, Other (pt had pain in her upper abdoman. 

)


: Reports: No Symptoms


Musculoskeletal: Reports: No Symptoms


Skin: Reports: No Symptoms





ED EXAM, GI/ABD





- Physical Exam


Exam: See Below


Text/Narrative:: 





pt arrived feeling very dixxy and was wretching and vomiting. She was feeling 

uncomfortable in the abdoman. 


Exam Limited By: No Limitations


General Appearance: Alert, Anxious, Moderate Distress, Other (pt was wretching. 

)


Ears: Normal TMs


Nose: Normal Inspection


Throat/Mouth: Normal Inspection


Head: Atraumatic


Neck: Normal Inspection


Respiratory/Chest: No Respiratory Distress


Cardiovascular: Regular Rate, Rhythm


GI/Abdominal Exam: Other (mild upper abdomanal tenderness)


 (Female) Exam: Deferred, Other (pt has a history of frequent utis. )


Rectal (Female) Exam: Deferred


Back Exam: Normal Inspection


Extremities: Normal Inspection


Neurological: Alert, Oriented, Other (pt was having vertigo. )


Psychiatric: Anxious





Course





- Vital Signs


Last Recorded V/S: 


                                Last Vital Signs











Temp  36.6 C   02/10/21 15:24


 


Pulse  61   02/10/21 15:26


 


Resp  18   02/10/21 15:24


 


BP  159/60 H  02/10/21 15:26


 


Pulse Ox  100   02/10/21 15:26














- Orders/Labs/Meds


Orders: 


                               Active Orders 24 hr











 Category Date Time Status


 


 EKG Documentation Completion [RC] ASDIRECTED Care  02/10/21 16:10 Active


 


 Sodium Chloride 0.9% [Normal Saline] 1,000 ml Med  02/10/21 15:00 Active





 IV ASDIRECTED   


 


 EKG 12 Lead [EK] Routine Ther  02/10/21 16:10 Ordered








                                Medication Orders





Sodium Chloride (Normal Saline)  1,000 mls @ 999 mls/hr IV ASDIRECTED DIANA


   Last Admin: 02/10/21 15:20  Dose: 999 mls/hr


   Documented by: PREILOR








Labs: 


                                Laboratory Tests











  02/10/21 02/10/21 02/10/21 Range/Units





  15:12 15:12 15:50 


 


WBC  7.1    (4.5-11.0)  K/uL


 


RBC  4.48    (3.30-5.50)  M/uL


 


Hgb  14.6  D    (12.0-15.0)  g/dL


 


Hct  42.3    (36.0-48.0)  %


 


MCV  94    (80-98)  fL


 


MCH  33 H    (27-31)  pg


 


MCHC  35    (32-36)  %


 


Plt Count  263    (150-400)  K/uL


 


Neut % (Auto)  75 H    (36-66)  %


 


Lymph % (Auto)  17 L    (24-44)  %


 


Mono % (Auto)  8 H    (2-6)  %


 


Eos % (Auto)  1 L    (2-4)  %


 


Baso % (Auto)  0    (0-1)  %


 


Sodium   134 L   (140-148)  mmol/L


 


Potassium   3.1 L   (3.6-5.2)  mmol/L


 


Chloride   98 L   (100-108)  mmol/L


 


Carbon Dioxide   24   (21-32)  mmol/L


 


Anion Gap   15.1 H   (5.0-14.0)  mmol/L


 


BUN   13  D   (7-18)  mg/dL


 


Creatinine   0.8   (0.6-1.0)  mg/dL


 


Est Cr Clr Drug Dosing   66.95   mL/min


 


Estimated GFR (MDRD)   > 60   (>60)  


 


Glucose   117 H   ()  mg/dL


 


Calcium   9.5   (8.5-10.1)  mg/dL


 


Total Bilirubin   0.5   (0.2-1.0)  mg/dL


 


AST   21   (15-37)  U/L


 


ALT   29   (12-78)  U/L


 


Alkaline Phosphatase   63   ()  U/L


 


Troponin I     (0.000-0.056)  ng/mL


 


C-Reactive Protein     (0.0-0.3)  mg/dL


 


Total Protein   7.0   (6.4-8.2)  g/dL


 


Albumin   3.9   (3.4-5.0)  g/dL


 


Globulin   3.1   (2.3-3.5)  g/dL


 


Albumin/Globulin Ratio   1.3   (1.2-2.2)  


 


Amylase     ()  U/L


 


Lipase    71 L  ()  U/L


 


Urine Color     (YELLOW)  


 


Urine Appearance     (CLEAR)  


 


Urine pH     (5.0-8.0)  


 


Ur Specific Gravity     (1.008-1.030)  


 


Urine Protein     (NEGATIVE)  mg/dL


 


Urine Glucose (UA)     (NEGATIVE)  mg/dL


 


Urine Ketones     (NEGATIVE)  mg/dL


 


Urine Occult Blood     (NEGATIVE)  


 


Urine Nitrite     (NEGATIVE)  


 


Urine Bilirubin     (NEGATIVE)  


 


Urine Urobilinogen     (0.2-1.0)  EU/dL


 


Ur Leukocyte Esterase     (NEGATIVE)  


 


Urine RBC     (0-5)  


 


Urine WBC     (0-5)  


 


Ur Epithelial Cells     


 


Amorphous Sediment     


 


Urine Bacteria     


 


Urine Mucus     














  02/10/21 02/10/21 02/10/21 Range/Units





  15:51 15:57 16:11 


 


WBC     (4.5-11.0)  K/uL


 


RBC     (3.30-5.50)  M/uL


 


Hgb     (12.0-15.0)  g/dL


 


Hct     (36.0-48.0)  %


 


MCV     (80-98)  fL


 


MCH     (27-31)  pg


 


MCHC     (32-36)  %


 


Plt Count     (150-400)  K/uL


 


Neut % (Auto)     (36-66)  %


 


Lymph % (Auto)     (24-44)  %


 


Mono % (Auto)     (2-6)  %


 


Eos % (Auto)     (2-4)  %


 


Baso % (Auto)     (0-1)  %


 


Sodium     (140-148)  mmol/L


 


Potassium     (3.6-5.2)  mmol/L


 


Chloride     (100-108)  mmol/L


 


Carbon Dioxide     (21-32)  mmol/L


 


Anion Gap     (5.0-14.0)  mmol/L


 


BUN     (7-18)  mg/dL


 


Creatinine     (0.6-1.0)  mg/dL


 


Est Cr Clr Drug Dosing     mL/min


 


Estimated GFR (MDRD)     (>60)  


 


Glucose     ()  mg/dL


 


Calcium     (8.5-10.1)  mg/dL


 


Total Bilirubin     (0.2-1.0)  mg/dL


 


AST     (15-37)  U/L


 


ALT     (12-78)  U/L


 


Alkaline Phosphatase     ()  U/L


 


Troponin I    < 0.017  (0.000-0.056)  ng/mL


 


C-Reactive Protein   0.13   (0.0-0.3)  mg/dL


 


Total Protein     (6.4-8.2)  g/dL


 


Albumin     (3.4-5.0)  g/dL


 


Globulin     (2.3-3.5)  g/dL


 


Albumin/Globulin Ratio     (1.2-2.2)  


 


Amylase  66    ()  U/L


 


Lipase     ()  U/L


 


Urine Color     (YELLOW)  


 


Urine Appearance     (CLEAR)  


 


Urine pH     (5.0-8.0)  


 


Ur Specific Gravity     (1.008-1.030)  


 


Urine Protein     (NEGATIVE)  mg/dL


 


Urine Glucose (UA)     (NEGATIVE)  mg/dL


 


Urine Ketones     (NEGATIVE)  mg/dL


 


Urine Occult Blood     (NEGATIVE)  


 


Urine Nitrite     (NEGATIVE)  


 


Urine Bilirubin     (NEGATIVE)  


 


Urine Urobilinogen     (0.2-1.0)  EU/dL


 


Ur Leukocyte Esterase     (NEGATIVE)  


 


Urine RBC     (0-5)  


 


Urine WBC     (0-5)  


 


Ur Epithelial Cells     


 


Amorphous Sediment     


 


Urine Bacteria     


 


Urine Mucus     














  02/10/21 Range/Units





  17:09 


 


WBC   (4.5-11.0)  K/uL


 


RBC   (3.30-5.50)  M/uL


 


Hgb   (12.0-15.0)  g/dL


 


Hct   (36.0-48.0)  %


 


MCV   (80-98)  fL


 


MCH   (27-31)  pg


 


MCHC   (32-36)  %


 


Plt Count   (150-400)  K/uL


 


Neut % (Auto)   (36-66)  %


 


Lymph % (Auto)   (24-44)  %


 


Mono % (Auto)   (2-6)  %


 


Eos % (Auto)   (2-4)  %


 


Baso % (Auto)   (0-1)  %


 


Sodium   (140-148)  mmol/L


 


Potassium   (3.6-5.2)  mmol/L


 


Chloride   (100-108)  mmol/L


 


Carbon Dioxide   (21-32)  mmol/L


 


Anion Gap   (5.0-14.0)  mmol/L


 


BUN   (7-18)  mg/dL


 


Creatinine   (0.6-1.0)  mg/dL


 


Est Cr Clr Drug Dosing   mL/min


 


Estimated GFR (MDRD)   (>60)  


 


Glucose   ()  mg/dL


 


Calcium   (8.5-10.1)  mg/dL


 


Total Bilirubin   (0.2-1.0)  mg/dL


 


AST   (15-37)  U/L


 


ALT   (12-78)  U/L


 


Alkaline Phosphatase   ()  U/L


 


Troponin I   (0.000-0.056)  ng/mL


 


C-Reactive Protein   (0.0-0.3)  mg/dL


 


Total Protein   (6.4-8.2)  g/dL


 


Albumin   (3.4-5.0)  g/dL


 


Globulin   (2.3-3.5)  g/dL


 


Albumin/Globulin Ratio   (1.2-2.2)  


 


Amylase   ()  U/L


 


Lipase   ()  U/L


 


Urine Color  Yellow  (YELLOW)  


 


Urine Appearance  Slightly cloudy A  (CLEAR)  


 


Urine pH  7.0  (5.0-8.0)  


 


Ur Specific Gravity  1.020  (1.008-1.030)  


 


Urine Protein  Negative  (NEGATIVE)  mg/dL


 


Urine Glucose (UA)  Negative  (NEGATIVE)  mg/dL


 


Urine Ketones  Negative  (NEGATIVE)  mg/dL


 


Urine Occult Blood  Negative  (NEGATIVE)  


 


Urine Nitrite  Negative  (NEGATIVE)  


 


Urine Bilirubin  Negative  (NEGATIVE)  


 


Urine Urobilinogen  0.2  (0.2-1.0)  EU/dL


 


Ur Leukocyte Esterase  Small H  (NEGATIVE)  


 


Urine RBC  0-5  (0-5)  


 


Urine WBC  0-5  (0-5)  


 


Ur Epithelial Cells  Occasional  


 


Amorphous Sediment  Rare  


 


Urine Bacteria  Many  


 


Urine Mucus  Occasional  











Meds: 


Medications











Generic Name Dose Route Start Last Admin





  Trade Name Freq  PRN Reason Stop Dose Admin


 


Sodium Chloride  1,000 mls @ 999 mls/hr  02/10/21 15:00  02/10/21 15:20





  Normal Saline  IV   999 mls/hr





  ASDIRECTED DIANA   Administration














Discontinued Medications














Generic Name Dose Route Start Last Admin





  Trade Name Freq  PRN Reason Stop Dose Admin


 


Hydromorphone HCl  0.5 mg  02/10/21 15:51  02/10/21 16:05





  Dilaudid  IVPUSH  02/10/21 15:52  0.5 mg





  ONETIME ONE   Administration


 


Meclizine HCl  25 mg  02/10/21 15:00  02/10/21 15:14





  Antivert  PO  02/10/21 15:01  25 mg





  ONETIME ONE   Administration


 


Meclizine HCl  25 mg  02/10/21 17:02  02/10/21 17:13





  Antivert  PO  02/10/21 17:03  25 mg





  ONETIME ONE   Administration


 


Ondansetron HCl  4 mg  02/10/21 14:59  02/10/21 15:20





  Zofran  IVPUSH  02/10/21 15:00  4 mg





  ONETIME ONE   Administration


 


Pantoprazole Sodium  40 mg  02/10/21 15:56  02/10/21 16:04





  Protonix Iv***  IVPUSH  02/10/21 15:57  40 mg





  ONETIME ONE   Administration














- Re-Assessments/Exams


Free Text/Narrative Re-Assessment/Exam: 





02/10/21 17:06


pt was given zoforan 4 mg, antivert x2, dilaudid .5 mg, protonix 40 mg.  she was

 given a liter of fluid. She had a Us which did not show any stones in the 

common duct. 





Departure





- Departure


Time of Disposition: 17:47


Disposition: Home, Self-Care 01


Condition: Fair


Clinical Impression: 


 Inner ear dysfunction, Acid reflux








- Discharge Information


Referrals: 


PCP,None [Primary Care Provider] - 


Forms:  ED Department Discharge


Care Plan Goals: 


prilosec 20 mg daily for reflux, antivert 25 mg tid for 2 days for dizziness, 

low activity , rtc if recurrent symptoms. 





Sepsis Event Note (ED)





- Evaluation


Sepsis Screening Result: No Definite Risk





- Focused Exam


Vital Signs: 


                                   Vital Signs











  Temp Pulse Resp BP Pulse Ox


 


 02/10/21 15:26   61   159/60 H  100


 


 02/10/21 15:24  36.6 C  70  18  176/67 H  100


 


 02/10/21 15:01  36.6 C  70  18  176/67 H  100














- My Orders


Last 24 Hours: 


My Active Orders





02/10/21 15:00


Sodium Chloride 0.9% [Normal Saline] 1,000 ml IV ASDIRECTED 





02/10/21 16:10


EKG Documentation Completion [RC] ASDIRECTED 


EKG 12 Lead [EK] Routine 














- Assessment/Plan


Last 24 Hours: 


My Active Orders





02/10/21 15:00


Sodium Chloride 0.9% [Normal Saline] 1,000 ml IV ASDIRECTED 





02/10/21 16:10


EKG Documentation Completion [RC] ASDIRECTED 


EKG 12 Lead [EK] Routine